# Patient Record
Sex: MALE | Race: WHITE | NOT HISPANIC OR LATINO | Employment: OTHER | URBAN - METROPOLITAN AREA
[De-identification: names, ages, dates, MRNs, and addresses within clinical notes are randomized per-mention and may not be internally consistent; named-entity substitution may affect disease eponyms.]

---

## 2019-12-29 ENCOUNTER — HOSPITAL ENCOUNTER (EMERGENCY)
Facility: CLINIC | Age: 29
End: 2019-12-29

## 2021-01-20 ENCOUNTER — AMBULATORY - HEALTHEAST (OUTPATIENT)
Dept: OTHER | Facility: CLINIC | Age: 31
End: 2021-01-20

## 2021-12-07 ENCOUNTER — HOSPITAL ENCOUNTER (EMERGENCY)
Facility: CLINIC | Age: 31
Discharge: HOME | End: 2021-12-07
Attending: STUDENT IN AN ORGANIZED HEALTH CARE EDUCATION/TRAINING PROGRAM | Admitting: STUDENT IN AN ORGANIZED HEALTH CARE EDUCATION/TRAINING PROGRAM
Payer: COMMERCIAL

## 2021-12-07 ENCOUNTER — APPOINTMENT (OUTPATIENT)
Dept: RADIOLOGY | Facility: CLINIC | Age: 31
End: 2021-12-07
Attending: STUDENT IN AN ORGANIZED HEALTH CARE EDUCATION/TRAINING PROGRAM
Payer: COMMERCIAL

## 2021-12-07 VITALS
OXYGEN SATURATION: 98 % | RESPIRATION RATE: 21 BRPM | WEIGHT: 180 LBS | TEMPERATURE: 97.6 F | HEART RATE: 65 BPM | SYSTOLIC BLOOD PRESSURE: 127 MMHG | DIASTOLIC BLOOD PRESSURE: 82 MMHG

## 2021-12-07 DIAGNOSIS — R07.9 NONSPECIFIC CHEST PAIN: ICD-10-CM

## 2021-12-07 DIAGNOSIS — K02.9 DENTAL CARIES: ICD-10-CM

## 2021-12-07 LAB
ANION GAP SERPL CALCULATED.3IONS-SCNC: 9 MMOL/L (ref 5–18)
ATRIAL RATE - MUSE: 78 BPM
BASOPHILS # BLD AUTO: 0 10E3/UL (ref 0–0.2)
BASOPHILS NFR BLD AUTO: 0 %
BUN SERPL-MCNC: 12 MG/DL (ref 8–22)
CALCIUM SERPL-MCNC: 8.8 MG/DL (ref 8.5–10.5)
CHLORIDE BLD-SCNC: 109 MMOL/L (ref 98–107)
CO2 SERPL-SCNC: 22 MMOL/L (ref 22–31)
CREAT SERPL-MCNC: 0.85 MG/DL (ref 0.7–1.3)
DIASTOLIC BLOOD PRESSURE - MUSE: 73 MMHG
EOSINOPHIL # BLD AUTO: 0.1 10E3/UL (ref 0–0.7)
EOSINOPHIL NFR BLD AUTO: 1 %
ERYTHROCYTE [DISTWIDTH] IN BLOOD BY AUTOMATED COUNT: 12.8 % (ref 10–15)
GFR SERPL CREATININE-BSD FRML MDRD: >90 ML/MIN/1.73M2
GLUCOSE BLD-MCNC: 102 MG/DL (ref 70–125)
HCT VFR BLD AUTO: 46.1 % (ref 40–53)
HGB BLD-MCNC: 15.6 G/DL (ref 13.3–17.7)
IMM GRANULOCYTES # BLD: 0 10E3/UL
IMM GRANULOCYTES NFR BLD: 0 %
INTERPRETATION ECG - MUSE: NORMAL
LYMPHOCYTES # BLD AUTO: 2.5 10E3/UL (ref 0.8–5.3)
LYMPHOCYTES NFR BLD AUTO: 26 %
MCH RBC QN AUTO: 29.5 PG (ref 26.5–33)
MCHC RBC AUTO-ENTMCNC: 33.8 G/DL (ref 31.5–36.5)
MCV RBC AUTO: 87 FL (ref 78–100)
MONOCYTES # BLD AUTO: 1 10E3/UL (ref 0–1.3)
MONOCYTES NFR BLD AUTO: 10 %
NEUTROPHILS # BLD AUTO: 6.3 10E3/UL (ref 1.6–8.3)
NEUTROPHILS NFR BLD AUTO: 63 %
NRBC # BLD AUTO: 0 10E3/UL
NRBC BLD AUTO-RTO: 0 /100
P AXIS - MUSE: 51 DEGREES
PLATELET # BLD AUTO: 268 10E3/UL (ref 150–450)
POTASSIUM BLD-SCNC: 4.7 MMOL/L (ref 3.5–5)
PR INTERVAL - MUSE: 140 MS
QRS DURATION - MUSE: 106 MS
QT - MUSE: 354 MS
QTC - MUSE: 403 MS
R AXIS - MUSE: 50 DEGREES
RBC # BLD AUTO: 5.28 10E6/UL (ref 4.4–5.9)
SODIUM SERPL-SCNC: 140 MMOL/L (ref 136–145)
SYSTOLIC BLOOD PRESSURE - MUSE: 127 MMHG
T AXIS - MUSE: 58 DEGREES
TROPONIN I SERPL-MCNC: <0.01 NG/ML (ref 0–0.29)
VENTRICULAR RATE- MUSE: 78 BPM
WBC # BLD AUTO: 10 10E3/UL (ref 4–11)

## 2021-12-07 PROCEDURE — 85025 COMPLETE CBC W/AUTO DIFF WBC: CPT | Performed by: STUDENT IN AN ORGANIZED HEALTH CARE EDUCATION/TRAINING PROGRAM

## 2021-12-07 PROCEDURE — 99285 EMERGENCY DEPT VISIT HI MDM: CPT | Mod: 25

## 2021-12-07 PROCEDURE — 250N000011 HC RX IP 250 OP 636: Performed by: STUDENT IN AN ORGANIZED HEALTH CARE EDUCATION/TRAINING PROGRAM

## 2021-12-07 PROCEDURE — 93005 ELECTROCARDIOGRAM TRACING: CPT | Performed by: STUDENT IN AN ORGANIZED HEALTH CARE EDUCATION/TRAINING PROGRAM

## 2021-12-07 PROCEDURE — 36415 COLL VENOUS BLD VENIPUNCTURE: CPT | Performed by: STUDENT IN AN ORGANIZED HEALTH CARE EDUCATION/TRAINING PROGRAM

## 2021-12-07 PROCEDURE — 80048 BASIC METABOLIC PNL TOTAL CA: CPT | Performed by: STUDENT IN AN ORGANIZED HEALTH CARE EDUCATION/TRAINING PROGRAM

## 2021-12-07 PROCEDURE — 84484 ASSAY OF TROPONIN QUANT: CPT | Performed by: STUDENT IN AN ORGANIZED HEALTH CARE EDUCATION/TRAINING PROGRAM

## 2021-12-07 PROCEDURE — 96374 THER/PROPH/DIAG INJ IV PUSH: CPT

## 2021-12-07 PROCEDURE — 71046 X-RAY EXAM CHEST 2 VIEWS: CPT

## 2021-12-07 RX ORDER — KETOROLAC TROMETHAMINE 30 MG/ML
15 INJECTION, SOLUTION INTRAMUSCULAR; INTRAVENOUS ONCE
Status: COMPLETED | OUTPATIENT
Start: 2021-12-07 | End: 2021-12-07

## 2021-12-07 RX ADMIN — KETOROLAC TROMETHAMINE 15 MG: 30 INJECTION, SOLUTION INTRAMUSCULAR at 07:01

## 2021-12-07 NOTE — ED PROVIDER NOTES
"EMERGENCY DEPARTMENT ENCOUNTER       ED Course & Medical Decision Making   6:38 AM I met with the patient to gather history and to perform my initial exam. I discussed the plan for care while in the Emergency Department. PPE (gloves, eye protection, N95 mask) was worn by me during patient encounters while patient wore mask.   8:10 AM I re-evaluated and updated patient. We discussed plans for discharge and patient is agreeable.    Final Impression  31 year old male presents for evaluation of chest discomfort that began last night about 8 PM while at rest.  Describes as being left-sided, itching\" underneath the skin on the left side of his chest.  Denies any exertional chest pain.  Also endorses some \"tingling\" in bilateral hands, though has grossly normal sensation and strength on exam.  Good pulses, equal in both upper extremities.  Does endorse some family history of cardiac disease with his mother having an MI at age 34.  Patient himself otherwise denies any other major medical history, denies known history of high blood pressure, high cholesterol, or diabetes.  Heart and lungs clear to auscultation bilaterally, vital signs stable, no hypoxia, fairly reassuring physical exam.  Did have a secondary complaint of some left lower dental pain.  On examination of mouth does have several fractured teeth that are effectively rounded down to the gumline on bilateral upper and lower molars, states that he knows he needs to see a dentist and does plan on seeing 1 in early January for dental extraction.  Is concerned about infection, does have some tenderness to palpation over the left lower molars.  Listed allergy to penicillin, though states that he tolerates amoxicillin without any issues.  Did discuss clindamycin, though states he would like to avoid clindamycin if possible as it does not agree with his stomach.  Overall, fairly reassuring cardiac work-up, EKG within normal limits, troponin negative, CBC and BMP normal.  " "Chest x-ray unremarkable.  Will write short prescription for Augmentin and recommend close dental follow-up.  We will also make a primary care referral as he does not have a primary care doctor.  All questions answered.  Will discharge home with his significant other.    Prior to making a final disposition on this patient the results of patient's tests and other diagnostic studies were discussed with the patient. All questions were answered. Patient expressed understanding of the plan and was amenable to it.    Medications   ketorolac (TORADOL) injection 15 mg (15 mg Intravenous Given 12/7/21 0701)       Final Impression     1. Nonspecific chest pain    2. Dental caries        Chief Complaint     Chief Complaint   Patient presents with     Chest Pain     Nausea       No notes on file    HPI     Emil Sprague is a 31 year old male who presents for evaluation of chest discomfort.    Patient reports an onset of left sided chest discomfort last night at 2000 while he was laying on a chair. Discomfort is described as an \"itching under the skin\" and improved with sleep but persisted this morning. He notes associated \"heart beating like crazy\" palpitations, bilateral arm numbness, and tingling to his bilateral hands. Patient took one of his mother's gabapentins without relief. Patient denies any significant activity or exertion last night. He does note both his parents have a history of MI with his mother having one at age 34. Patient reports a personal history of heart murmur but otherwise denies PMHx. No daily medications. Patient denies additional medical concerns or complaints at this time.      IJulita am serving as a scribe to document services personally performed by Dr. Jg Melvin MD, based on my observation and the provider's statements to me. I, Dr. Jg Melvin MD attest that Julita Truong is acting in a scribe capacity, has observed my performance of the services and has documented " "them in accordance with my direction.    Past Medical History     No past medical history on file.  No past surgical history on file.  No family history on file.   Social History     Tobacco Use     Smoking status: Smoker, Current Status Unknown     Smokeless tobacco: Not on file   Substance Use Topics     Alcohol use: Not on file     Drug use: Not on file       Relevant past medical, surgical, family and social history as documented above, has been reviewed and discussed with patient. No changes or additions, unless otherwise noted in the HPI.    Current Medications     amoxicillin-clavulanate (AUGMENTIN) 875-125 MG tablet  lidocaine (LIDODERM) 5 %        Allergies     Allergies   Allergen Reactions     Penicillins Anaphylaxis       Review of Systems     Constitutional: Denies fever, chills  HENT: Denies sore throat  Respiratory: Denies cough or shortness of breath    Cardiovascular: Positive for left chest discomfort (\"itching under the skin\") and palpitations. Denies leg swelling  GI: Denies abdominal pain, vomiting  Musculoskeletal: Denies any new back pain   Skin: Denies rashes  Neurologic: Positive for bilateral arm numbness and bilateral tingling in hands. Denies current headache, new weakness, focal weakness    Remainder of systems reviewed, unless noted in HPI all others negative.    Physical Exam     /82   Pulse 65   Temp 97.6  F (36.4  C) (Oral)   Resp 21   Wt 81.6 kg (180 lb)   SpO2 98%   Constitutional: Awake, alert, in no acute distress  Head: Normocephalic, atraumatic.  ENT: Mucous membranes moist.  Poor dentition throughout, bilateral upper and lower molars are effectively rotted to the gumline, mild tenderness with palpation/axial load over the left lower molar.  No fluctuance along gumline.  Uvula midline, no trismus, managing secretions without difficulty.  Eyes: PERRL, EOMI, Conjunctiva normal  Respiratory: Respirations even, unlabored. Lungs clear to ascultation bilaterally, in no " acute respiratory distress.  Cardiovascular: Regular rate and rhythm. +2 radial pulses, equal bilaterally. No pitting edema.   GI: Abdomen soft, non-tender. No guarding or rebound.   Musculoskeletal: Moves all 4 extremities equally, strength symmetrical on bilateral uppers and lowers.  Integument: Warm, dry. No rash.   Neurologic: Alert & oriented x 3. Normal speech. Grossly normal motor and sensory function. No focal deficits noted.  Psychiatric: Normal mood and affect. Normal judgement.    Labs & Imaging     Results for orders placed or performed during the hospital encounter of 12/07/21   Chest XR,  PA & LAT    Impression    IMPRESSION: The lungs are clear. The heart size and pulmonary vascularity are normal. No pneumothorax or pleural effusion. Negative chest radiograph.   Basic metabolic panel   Result Value Ref Range    Sodium 140 136 - 145 mmol/L    Potassium 4.7 3.5 - 5.0 mmol/L    Chloride 109 (H) 98 - 107 mmol/L    Carbon Dioxide (CO2) 22 22 - 31 mmol/L    Anion Gap 9 5 - 18 mmol/L    Urea Nitrogen 12 8 - 22 mg/dL    Creatinine 0.85 0.70 - 1.30 mg/dL    Calcium 8.8 8.5 - 10.5 mg/dL    Glucose 102 70 - 125 mg/dL    GFR Estimate >90 >60 mL/min/1.73m2   Troponin I (now)   Result Value Ref Range    Troponin I <0.01 0.00 - 0.29 ng/mL   CBC with platelets and differential   Result Value Ref Range    WBC Count 10.0 4.0 - 11.0 10e3/uL    RBC Count 5.28 4.40 - 5.90 10e6/uL    Hemoglobin 15.6 13.3 - 17.7 g/dL    Hematocrit 46.1 40.0 - 53.0 %    MCV 87 78 - 100 fL    MCH 29.5 26.5 - 33.0 pg    MCHC 33.8 31.5 - 36.5 g/dL    RDW 12.8 10.0 - 15.0 %    Platelet Count 268 150 - 450 10e3/uL    % Neutrophils 63 %    % Lymphocytes 26 %    % Monocytes 10 %    % Eosinophils 1 %    % Basophils 0 %    % Immature Granulocytes 0 %    NRBCs per 100 WBC 0 <1 /100    Absolute Neutrophils 6.3 1.6 - 8.3 10e3/uL    Absolute Lymphocytes 2.5 0.8 - 5.3 10e3/uL    Absolute Monocytes 1.0 0.0 - 1.3 10e3/uL    Absolute Eosinophils 0.1 0.0 -  0.7 10e3/uL    Absolute Basophils 0.0 0.0 - 0.2 10e3/uL    Absolute Immature Granulocytes 0.0 <=0.4 10e3/uL    Absolute NRBCs 0.0 10e3/uL   ECG 12-Lead with MUSE - SJN,SJO,WW   Result Value Ref Range    Systolic Blood Pressure 127 mmHg    Diastolic Blood Pressure 73 mmHg    Ventricular Rate 78 BPM    Atrial Rate 78 BPM    MT Interval 140 ms    QRS Duration 106 ms     ms    QTc 403 ms    P Axis 51 degrees    R AXIS 50 degrees    T Axis 58 degrees    Interpretation ECG       Sinus rhythm  RSR' or QR pattern in V1 suggests right ventricular conduction delay  Septal infarct , age undetermined  Abnormal ECG  No previous ECGs available  Confirmed by SEE ED PROVIDER NOTE FOR, ECG INTERPRETATION ([id number]),  FREIDA ROJAS ([id number]) on 12/7/2021 7:01:27 AM           EKG     Sinus rhythm, rate 78.  .  .  QTc 4 3.  RSR' morphology in V1.  No STEMI.  No ST segment depressions or pathologic T wave inversions noted.     Jg Melvin MD  12/07/21 3335

## 2021-12-07 NOTE — ED TRIAGE NOTES
"Pt presents to the ED with complaints of CP that started at 20:00 last night. Pt states that it got better when he rested. Pt currently complains of nausea, w/o any emesis. Denies any cardiac hx, but \"both parents have had heart attacks\". Not on blood thinners. Took one of his moms gabapentin's. Did not take any aspirin. Pt complains of numbness and tingling down both arms.   "

## 2023-09-09 ENCOUNTER — HOSPITAL ENCOUNTER (EMERGENCY)
Facility: HOSPITAL | Age: 33
Discharge: LEFT BEFORE DISCHARGE | End: 2023-09-09
Attending: EMERGENCY MEDICINE | Admitting: EMERGENCY MEDICINE
Payer: COMMERCIAL

## 2023-09-09 VITALS
BODY MASS INDEX: 24.5 KG/M2 | WEIGHT: 175 LBS | RESPIRATION RATE: 18 BRPM | HEART RATE: 104 BPM | OXYGEN SATURATION: 99 % | HEIGHT: 71 IN | DIASTOLIC BLOOD PRESSURE: 107 MMHG | SYSTOLIC BLOOD PRESSURE: 135 MMHG | TEMPERATURE: 98.2 F

## 2023-09-09 PROCEDURE — 99281 EMR DPT VST MAYX REQ PHY/QHP: CPT

## 2023-09-10 NOTE — ED TRIAGE NOTES
Patient with chicken wire that pierced his skin earlier in the day. Patient states the metal was anthony and he is unsure when his last tetanus shot was. Patient states he is only here for a tetanus shot.     Triage Assessment       Row Name 09/09/23 2109       Triage Assessment (Adult)    Airway WDL WDL       Respiratory WDL    Respiratory WDL WDL       Skin Circulation/Temperature WDL    Skin Circulation/Temperature WDL WDL       Cardiac WDL    Cardiac WDL WDL       Peripheral/Neurovascular WDL    Peripheral Neurovascular WDL WDL       Cognitive/Neuro/Behavioral WDL    Cognitive/Neuro/Behavioral WDL WDL

## 2023-11-20 ENCOUNTER — APPOINTMENT (OUTPATIENT)
Dept: RADIOLOGY | Facility: CLINIC | Age: 33
End: 2023-11-20
Attending: PHYSICIAN ASSISTANT

## 2023-11-20 ENCOUNTER — HOSPITAL ENCOUNTER (EMERGENCY)
Facility: CLINIC | Age: 33
Discharge: HOME | End: 2023-11-20
Admitting: PHYSICIAN ASSISTANT

## 2023-11-20 VITALS
DIASTOLIC BLOOD PRESSURE: 80 MMHG | HEIGHT: 71 IN | WEIGHT: 176 LBS | HEART RATE: 72 BPM | RESPIRATION RATE: 19 BRPM | BODY MASS INDEX: 24.64 KG/M2 | TEMPERATURE: 98.2 F | SYSTOLIC BLOOD PRESSURE: 120 MMHG | OXYGEN SATURATION: 99 %

## 2023-11-20 DIAGNOSIS — J32.9 SINUSITIS, UNSPECIFIED CHRONICITY, UNSPECIFIED LOCATION: ICD-10-CM

## 2023-11-20 DIAGNOSIS — J06.9 UPPER RESPIRATORY TRACT INFECTION, UNSPECIFIED TYPE: ICD-10-CM

## 2023-11-20 PROBLEM — R20.2 NUMBNESS AND TINGLING OF LEFT ARM AND LEG: Status: ACTIVE | Noted: 2022-08-25

## 2023-11-20 PROBLEM — R20.0 NUMBNESS AND TINGLING OF LEFT ARM AND LEG: Status: ACTIVE | Noted: 2022-08-25
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
## 2023-11-20 PROBLEM — H53.9 VISION CHANGES: Status: ACTIVE | Noted: 2022-08-25

## 2023-11-20 LAB
ALBUMIN SERPL BCG-MCNC: 4.6 G/DL (ref 3.5–5.2)
ALP SERPL-CCNC: 63 U/L (ref 40–150)
ALT SERPL W P-5'-P-CCNC: 14 U/L (ref 0–70)
ANION GAP SERPL CALCULATED.3IONS-SCNC: 9 MMOL/L (ref 7–15)
AST SERPL W P-5'-P-CCNC: 18 U/L (ref 0–45)
ATRIAL RATE - MUSE: 72 BPM
BILIRUB DIRECT SERPL-MCNC: <0.2 MG/DL (ref 0–0.3)
BILIRUB SERPL-MCNC: <0.2 MG/DL
BUN SERPL-MCNC: 9.4 MG/DL (ref 6–20)
CALCIUM SERPL-MCNC: 9.8 MG/DL (ref 8.6–10)
CHLORIDE SERPL-SCNC: 105 MMOL/L (ref 98–107)
CREAT SERPL-MCNC: 0.83 MG/DL (ref 0.67–1.17)
D DIMER PPP FEU-MCNC: 0.3 UG/ML FEU (ref 0–0.5)
DEPRECATED HCO3 PLAS-SCNC: 24 MMOL/L (ref 22–29)
DIASTOLIC BLOOD PRESSURE - MUSE: NORMAL MMHG
EGFRCR SERPLBLD CKD-EPI 2021: >90 ML/MIN/1.73M2
ERYTHROCYTE [DISTWIDTH] IN BLOOD BY AUTOMATED COUNT: 12.9 % (ref 10–15)
FLUAV RNA SPEC QL NAA+PROBE: NEGATIVE
FLUBV RNA RESP QL NAA+PROBE: NEGATIVE
GLUCOSE SERPL-MCNC: 102 MG/DL (ref 70–99)
HCT VFR BLD AUTO: 45.7 % (ref 40–53)
HGB BLD-MCNC: 15.6 G/DL (ref 13.3–17.7)
INTERPRETATION ECG - MUSE: NORMAL
LIPASE SERPL-CCNC: 40 U/L (ref 13–60)
MCH RBC QN AUTO: 29.9 PG (ref 26.5–33)
MCHC RBC AUTO-ENTMCNC: 34.1 G/DL (ref 31.5–36.5)
MCV RBC AUTO: 88 FL (ref 78–100)
P AXIS - MUSE: 38 DEGREES
PLATELET # BLD AUTO: 272 10E3/UL (ref 150–450)
POTASSIUM SERPL-SCNC: 4.6 MMOL/L (ref 3.4–5.3)
PR INTERVAL - MUSE: 128 MS
PROT SERPL-MCNC: 7.6 G/DL (ref 6.4–8.3)
QRS DURATION - MUSE: 96 MS
QT - MUSE: 336 MS
QTC - MUSE: 367 MS
R AXIS - MUSE: 49 DEGREES
RBC # BLD AUTO: 5.21 10E6/UL (ref 4.4–5.9)
RSV RNA SPEC NAA+PROBE: NEGATIVE
SARS-COV-2 RNA RESP QL NAA+PROBE: NEGATIVE
SODIUM SERPL-SCNC: 138 MMOL/L (ref 135–145)
SYSTOLIC BLOOD PRESSURE - MUSE: NORMAL MMHG
T AXIS - MUSE: 54 DEGREES
TROPONIN T SERPL HS-MCNC: <6 NG/L
VENTRICULAR RATE- MUSE: 72 BPM
WBC # BLD AUTO: 13 10E3/UL (ref 4–11)

## 2023-11-20 PROCEDURE — 36415 COLL VENOUS BLD VENIPUNCTURE: CPT | Performed by: PHYSICIAN ASSISTANT

## 2023-11-20 PROCEDURE — 85027 COMPLETE CBC AUTOMATED: CPT | Performed by: PHYSICIAN ASSISTANT

## 2023-11-20 PROCEDURE — 87637 SARSCOV2&INF A&B&RSV AMP PRB: CPT | Performed by: PHYSICIAN ASSISTANT

## 2023-11-20 PROCEDURE — 84484 ASSAY OF TROPONIN QUANT: CPT | Performed by: PHYSICIAN ASSISTANT

## 2023-11-20 PROCEDURE — 99285 EMERGENCY DEPT VISIT HI MDM: CPT | Mod: 25

## 2023-11-20 PROCEDURE — 85379 FIBRIN DEGRADATION QUANT: CPT | Performed by: PHYSICIAN ASSISTANT

## 2023-11-20 PROCEDURE — 258N000003 HC RX IP 258 OP 636: Performed by: PHYSICIAN ASSISTANT

## 2023-11-20 PROCEDURE — 82248 BILIRUBIN DIRECT: CPT | Performed by: PHYSICIAN ASSISTANT

## 2023-11-20 PROCEDURE — 71046 X-RAY EXAM CHEST 2 VIEWS: CPT

## 2023-11-20 PROCEDURE — 82374 ASSAY BLOOD CARBON DIOXIDE: CPT | Performed by: PHYSICIAN ASSISTANT

## 2023-11-20 PROCEDURE — 96360 HYDRATION IV INFUSION INIT: CPT

## 2023-11-20 PROCEDURE — 93005 ELECTROCARDIOGRAM TRACING: CPT | Performed by: PHYSICIAN ASSISTANT

## 2023-11-20 PROCEDURE — 250N000013 HC RX MED GY IP 250 OP 250 PS 637: Performed by: PHYSICIAN ASSISTANT

## 2023-11-20 PROCEDURE — 83690 ASSAY OF LIPASE: CPT | Performed by: PHYSICIAN ASSISTANT

## 2023-11-20 RX ORDER — IBUPROFEN 600 MG/1
600 TABLET, FILM COATED ORAL ONCE
Status: COMPLETED | OUTPATIENT
Start: 2023-11-20 | End: 2023-11-20

## 2023-11-20 RX ORDER — AZITHROMYCIN 250 MG/1
TABLET, FILM COATED ORAL
Qty: 6 TABLET | Refills: 0 | Status: SHIPPED | OUTPATIENT
Start: 2023-11-20 | End: 2023-11-25

## 2023-11-20 RX ORDER — ACETAMINOPHEN 500 MG
1000 TABLET ORAL ONCE
Status: COMPLETED | OUTPATIENT
Start: 2023-11-20 | End: 2023-11-20

## 2023-11-20 RX ADMIN — SODIUM CHLORIDE 1000 ML: 9 INJECTION, SOLUTION INTRAVENOUS at 11:46

## 2023-11-20 RX ADMIN — ACETAMINOPHEN 1000 MG: 500 TABLET ORAL at 11:33

## 2023-11-20 RX ADMIN — IBUPROFEN 600 MG: 600 TABLET ORAL at 11:33

## 2023-11-20 ASSESSMENT — ACTIVITIES OF DAILY LIVING (ADL): ADLS_ACUITY_SCORE: 41

## 2023-11-20 NOTE — ED TRIAGE NOTES
Patient has cough, runny nose, generalized weakness, nausea, vomiting, right maxillary sinus pressure, back pain- entire back, diarrhea x1 week.

## 2023-11-20 NOTE — DISCHARGE INSTRUCTIONS
You were seen in the emergency department for cough, fatigue and right sinus pressure.  Thankfully, your COVID/flu/RSV test is negative.  Chest x-ray does not show any signs of pneumonia.  Your white blood cells are very slightly elevated.  This is consistent with acute illness.  As you are on about day 7 of your illness course, I encourage you to wait a couple more days to see if symptoms start to improve.  If they are not improving, then you may take the Z-Edu to help with your symptoms.    Continue to use Tylenol, ibuprofen and over-the-counter symptomatic management.  Rest and drink fluids.  Adding warm tea with honey can help sore throat and cough.    Adult Tylenol dosing:  Take 500 to 1000 mg of Tylenol every 6 hours as needed.  Do not exceed 4000 mg of Tylenol in 24 hours from all medication sources.  It is important to check other medications that you might be taking (like DayQuil/NyQuil) as these may also contain Tylenol (acetaminophen).     Adult Ibuprofen dosing:  Take 600 to 800 mg of ibuprofen every 6 hours as needed.  Do not exceed 3200 mg of ibuprofen in 24 hours.  It is safe to take Tylenol and ibuprofen together.      Return to the ER with shortness of breath, new chest pain or fever >102F despite tylenol and ibuprofen.

## 2023-11-20 NOTE — ED PROVIDER NOTES
EMERGENCY DEPARTMENT ENCOUNTER      NAME: Emil Sprague  AGE: 33 year old male  YOB: 1990  MRN: [id number]  EVALUATION DATE & TIME: 11/20/2023 11:14 AM    PCP: No Ref-Primary, Physician    ED PROVIDER: Selma Johnson PA-C      Chief Complaint   Patient presents with    Generalized Weakness    Nausea, Vomiting, & Diarrhea    Cough    Nasal Congestion     FINAL IMPRESSION:  1. Upper respiratory tract infection, unspecified type    2. Sinusitis, unspecified chronicity, unspecified location          MEDICAL DECISION MAKING:    Pertinent Labs & Imaging studies reviewed. (See chart for details)  33 year old male with a h/o cervical radiculopathy with bilateral UE neuropathy, cigarette use presents to the Emergency Department for evaluation of body aches, fatugue, cough and chest pain.  On exam he is fatigued appearing, alert and nontoxic.  Vitals are WNL.  He does have clear lungs throughout without crackle or wheeze.  No heart murmur or arrhythmia.  No appreciable lymphadenopathy.  Posterior oropharynx with mild erythema, no tonsillar exudate or edema.      Differential diagnosis includes COVID-19, influenza, RSV, other viral URI, sinusitis, pneumonia, electrolyte derangement, less likely ACS, PE.  CBC with slight leukocytosis of 13, BMP is unremarkable.  EKG shows sinus rhythm without ST elevation/depression or reciprocal changes to suggest ischemia.  High-sensitivity troponin is -6, no need for repeat given duration of symptoms over several days, no suspicion of myocardial injury at this time.  D-dimer was normal.  Chest x-ray shows no evidence of infiltrate, effusion or focal consolidation to suggest infection.    Discussed with him suspicion for viral illness versus emerging bacterial sinusitis, I did encourage him to wait approximately 3 more days to see if symptoms begin to resolve, but will give delayed prescription for Z-Edu to treat sinusitis if normal symptomatic resolution.    There is  no evidence of acute or emergent process requiring intervention at this time. Pt is appropriate for outpatient management. Provisional nature of today's diagnosis was discussed and strict return precautions were given. Pt expressed understanding and He was discharged to home in good condition.     Medical Decision Making    History:  Supplemental history from: Documented in chart, if applicable  External Record(s) reviewed: Documented in chart, if applicable.    Work Up:  Chart documentation includes differential considered and any EKGs or imaging independently interpreted by provider, where specified.  In additional to work up documented, I considered the following work up: Documented in chart, if applicable.    External consultation:  Discussion of management with another provider: Documented in chart, if applicable    Complicating factors:  Care impacted by chronic illness: Smoking / Nicotine Use  Care affected by social determinants of health: N/A    Disposition considerations: Discharge. I prescribed additional prescription strength medication(s) as charted. See documentation for any additional details.        CRITICAL CARE: None    ED COURSE  10:45 AM  Met and evaluated patient in ED triage. Discussed ED plan.   2:00 PM discharged to home in good condition by RN.     MEDICATIONS GIVEN IN THE EMERGENCY:  Medications   sodium chloride 0.9% BOLUS 1,000 mL (0 mLs Intravenous Stopped 11/20/23 1247)   acetaminophen (TYLENOL) tablet 1,000 mg (1,000 mg Oral $Given 11/20/23 1133)   ibuprofen (ADVIL/MOTRIN) tablet 600 mg (600 mg Oral $Given 11/20/23 1133)       NEW PRESCRIPTIONS STARTED AT TODAY'S ER VISIT  Discharge Medication List as of 11/20/2023  1:52 PM        START taking these medications    Details   azithromycin (ZITHROMAX) 250 MG tablet Take 2 tablets (500 mg) by mouth daily for 1 day, THEN 1 tablet (250 mg) daily for 4 days., Disp-6 tablet, R-0, Local Print                 =================================================================    [facility]    Patient information was obtained from: patient     Use of Intrepreter: N/A     Emil Sprague is a 33 year old male who presents for evaluation of approximate 7 days of body aches, cough, sore throat.  He has been taking Robitussin, Mucinex without significant relief.  Reports that he was recently traveling, drove back to [place] from [place] the day before symptoms started.  He is unsure of any ill contacts prior to arrival back to [place], but did have kids who were sick with cough and runny nose, however his kids have returned to baseline and his symptoms have continued.  He has not measured any fevers at home.  Notes that he is generally worn out with fatigue and muscle aches.  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  He is otherwise healthy.  Does note some centralized chest pain associated with his cough.  He has longstanding numbness and tingling of the upper extremities attributed to some degenerative disc disease, this is unchanged.      REVIEW OF SYSTEMS   As noted in HPI. All other systems negative.    PAST MEDICAL HISTORY:  No past medical history on file.    PAST SURGICAL HISTORY:  No past surgical history on file.        CURRENT MEDICATIONS:    azithromycin (ZITHROMAX) 250 MG tablet  amoxicillin-clavulanate (AUGMENTIN) 875-125 MG tablet  lidocaine (LIDODERM) 5 %        ALLERGIES:  Allergies   Allergen Reactions    Penicillins Anaphylaxis       FAMILY HISTORY:  No family history on file.    SOCIAL HISTORY:   Social History     Socioeconomic History    Marital status: Single     Spouse name: Not on file    Number of children: Not on file    Years of education: Not on file    Highest education level: Not on file   Occupational History    Not on file   Tobacco Use    Smoking status: Smoker, Current Status Unknown  "   Smokeless tobacco: Not on file   Substance and Sexual Activity    Alcohol use: Not on file    Drug use: Not on file    Sexual activity: Not on file   Other Topics Concern    Not on file   Social History Narrative    Not on file     Social Determinants of Health     Financial Resource Strain: Not on file   Food Insecurity: Not on file   Transportation Needs: Not on file   Physical Activity: Not on file   Stress: Not on file   Social Connections: Not on file   Interpersonal Safety: Not on file   Housing Stability: Not on file         VITALS:  Patient Vitals for the past 24 hrs:   BP Temp Temp src Pulse Resp SpO2 Height Weight   11/20/23 1318 120/80 -- -- 72 19 99 % -- --   11/20/23 1300 131/89 -- -- 76 23 98 % -- --   11/20/23 1232 134/78 -- -- 75 22 100 % -- --   11/20/23 1217 134/86 -- -- 78 18 98 % -- --   11/20/23 1200 133/77 -- -- 77 19 98 % -- --   11/20/23 1157 137/72 98.2  F (36.8  C) Oral 81 16 99 % -- --   11/20/23 1029 (!) 140/92 -- -- -- -- -- -- --   11/20/23 1028 -- 99.2  F (37.3  C) Temporal 97 22 99 % 1.803 m (5' 11\") 79.8 kg (176 lb)       PHYSICAL EXAM    Physical Exam  Vitals reviewed.   Constitutional:       General: He is not in acute distress.     Appearance: Normal appearance. He is ill-appearing. He is not toxic-appearing or diaphoretic.      Comments: Uncomfortable appearing   HENT:      Nose: Nose normal.      Mouth/Throat:      Mouth: Mucous membranes are moist.      Pharynx: Posterior oropharyngeal erythema (mild PO) present. No oropharyngeal exudate.   Eyes:      Conjunctiva/sclera: Conjunctivae normal.   Neck:      Comments: No preauricular, posterior auricular, submental, anterior cervical or supraclavicular lymphadenopathy appreciated.  Cardiovascular:      Rate and Rhythm: Normal rate.      Pulses: Normal pulses.      Heart sounds: No murmur heard.  Pulmonary:      Effort: Pulmonary effort is normal. No respiratory distress.      Breath sounds: No stridor. No wheezing, rhonchi or " rales.   Musculoskeletal:         General: Normal range of motion.      Cervical back: Normal range of motion.      Right lower leg: No edema.      Left lower leg: No edema.   Skin:     General: Skin is warm.      Capillary Refill: Capillary refill takes less than 2 seconds.      Findings: No lesion.   Neurological:      Mental Status: He is alert.          LAB:  All pertinent labs reviewed and interpreted.    Labs Ordered and Resulted from Time of ED Arrival to Time of ED Departure   CBC WITH PLATELETS - Abnormal       Result Value    WBC Count 13.0 (*)     RBC Count 5.21      Hemoglobin 15.6      Hematocrit 45.7      MCV 88      MCH 29.9      MCHC 34.1      RDW 12.9      Platelet Count 272     BASIC METABOLIC PANEL - Abnormal    Sodium 138      Potassium 4.6      Chloride 105      Carbon Dioxide (CO2) 24      Anion Gap 9      Urea Nitrogen 9.4      Creatinine 0.83      GFR Estimate >90      Calcium 9.8      Glucose 102 (*)    HEPATIC FUNCTION PANEL - Normal    Protein Total 7.6      Albumin 4.6      Bilirubin Total <0.2      Alkaline Phosphatase 63      AST 18      ALT 14      Bilirubin Direct <0.20     LIPASE - Normal    Lipase 40     INFLUENZA A/B, RSV, & SARS-COV2 PCR - Normal    Influenza A PCR Negative      Influenza B PCR Negative      RSV PCR Negative      SARS CoV2 PCR Negative     TROPONIN T, HIGH SENSITIVITY - Normal    Troponin T, High Sensitivity <6     D DIMER QUANTITATIVE - Normal    D-Dimer Quantitative 0.30           RADIOLOGY:  Reviewed all pertinent imaging. Please see official radiology report    Chest XR,  PA & LAT   Final Result   IMPRESSION: Lungs are clear. No pleural effusion. Heart size and pulmonary vascularity within normal limits.            EKG:    Performed at: 1130 AM  Impression: sinus rhythm  Vent rate: 72 bpm  Pr interval: 128 ms  QRS duration: 96 ms  QT/Qtc: 336/367 ms  Dr. Gwyn Allen and I have independently reviewed and interpreted the EKG(s) documented above.      Selma  HUYEN Johnson  Emergency Medicine  [facility] [place] EMERGENCY ROOM  [address] [address] [phone]  Dept: [phone]    This note has in part been created with speech recognition technology and may create an occasional, unintended word/grammar substitution. Errors are generally corrected in real time. Please message me via [email or web address] [email or web address] if you note any errors requiring clarification.       Selma Johnson PA-C  11/20/23 7900